# Patient Record
Sex: FEMALE | URBAN - METROPOLITAN AREA
[De-identification: names, ages, dates, MRNs, and addresses within clinical notes are randomized per-mention and may not be internally consistent; named-entity substitution may affect disease eponyms.]

---

## 2021-04-29 ENCOUNTER — OFFICE VISIT (OUTPATIENT)
Dept: URBAN - METROPOLITAN AREA CLINIC 64 | Facility: CLINIC | Age: 86
End: 2021-04-29
Payer: OTHER GOVERNMENT

## 2021-04-29 DIAGNOSIS — H02.413 MECHANICAL PTOSIS OF BILATERAL EYELIDS: ICD-10-CM

## 2021-04-29 DIAGNOSIS — H52.223 REGULAR ASTIGMATISM, BILATERAL: ICD-10-CM

## 2021-04-29 PROCEDURE — 92136 OPHTHALMIC BIOMETRY: CPT | Performed by: STUDENT IN AN ORGANIZED HEALTH CARE EDUCATION/TRAINING PROGRAM

## 2021-04-29 PROCEDURE — 99204 OFFICE O/P NEW MOD 45 MIN: CPT | Performed by: STUDENT IN AN ORGANIZED HEALTH CARE EDUCATION/TRAINING PROGRAM

## 2021-04-29 ASSESSMENT — INTRAOCULAR PRESSURE
OD: 9
OS: 12

## 2021-04-29 ASSESSMENT — VISUAL ACUITY
OS: 20/150
OD: 20/40

## 2021-04-29 NOTE — IMPRESSION/PLAN
Impression: Mechanical ptosis of bilateral eyelids: H02.413.  Plan: Referral to Dr Alejandro Frank for eval

## 2021-04-29 NOTE — IMPRESSION/PLAN
Impression: Combined forms of age-related cataract, left eye: H25.812. Plan: Discussed cataracts, treatment options, and surgical risks/benefits with patient including bleeding, infection, capsular break, glaucoma, corneal clouding. Pt understands changing glasses will not improve vision/glare. Discussed Premium options available. Pt understands the need for glasses after surgery if advanced technology lenses are not chosen and that there is NO PERFECT IOL. Pt understands that even the highest ATLs have limitations including but not limited to Halos/Glare/Difficulty in Dim Lighting and Intermediate BCVA may need glasses after SX. Patient elects surgical treatment. Recommend ORA. Recommend Dexycu + Moxifloxacin (PF) Intracameral Injection. Lens Recommendation: Monofocal
Technology: OK for ORA and LENSX Aim OS: -0.25. Notes:

## 2021-08-31 ENCOUNTER — PRE-OPERATIVE VISIT (OUTPATIENT)
Dept: URBAN - METROPOLITAN AREA CLINIC 64 | Facility: CLINIC | Age: 86
End: 2021-08-31
Payer: OTHER GOVERNMENT

## 2021-08-31 DIAGNOSIS — H35.3132 BILATERAL NONEXUDATIVE AGE-RELATED MACULAR DEGENERATION, INTERMEDIATE DRY STAGE: ICD-10-CM

## 2021-08-31 DIAGNOSIS — H25.812 COMBINED FORMS OF AGE-RELATED CATARACT, LEFT EYE: ICD-10-CM

## 2021-08-31 PROCEDURE — 99214 OFFICE O/P EST MOD 30 MIN: CPT | Performed by: STUDENT IN AN ORGANIZED HEALTH CARE EDUCATION/TRAINING PROGRAM

## 2021-08-31 PROCEDURE — 92015 DETERMINE REFRACTIVE STATE: CPT | Performed by: STUDENT IN AN ORGANIZED HEALTH CARE EDUCATION/TRAINING PROGRAM

## 2021-08-31 ASSESSMENT — VISUAL ACUITY
OD: 20/40
OS: 20/60

## 2021-08-31 ASSESSMENT — INTRAOCULAR PRESSURE
OD: 10
OS: 14

## 2021-08-31 NOTE — IMPRESSION/PLAN
Impression: Combined forms of age-related cataract, left eye: H25.812. Plan: Discussed cataracts, treatment options, and surgical risks/benefits with patient including bleeding, infection, capsular break, glaucoma, corneal clouding. Pt understands changing glasses will not improve vision/glare. Discussed Premium options available. Pt understands the need for glasses after surgery if advanced technology lenses are not chosen and that there is NO PERFECT IOL. Pt understands that even the highest ATLs have limitations including but not limited to Halos/Glare/Difficulty in Dim Lighting and Intermediate BCVA may need glasses after SX. Patient elects surgical treatment. Recommend ORA. Recommend Dexycu + Moxifloxacin (PF) Intracameral Injection. Lens Recommendation: Monofocal OS Technology: OK for ORA and [employer] Aim OD: -0.25. Aim OS: -0.25. Notes: 3+ Dense, plan for Vision [facility]

## 2021-08-31 NOTE — IMPRESSION/PLAN
Impression: Bilateral nonexudative age-related macular degeneration, intermediate dry stage: H35.3132. Plan: Macular OCT done today / Macular degeneration dry type. Patient was instructed the use of the Amsler grid and will continue monitoring vision. Recommend taking AREDS II vitamins and antioxidants. Patient informed to call office immediately with any vision and/or Amsler grid changes.

## 2021-09-10 ENCOUNTER — ADULT PHYSICAL (OUTPATIENT)
Dept: URBAN - METROPOLITAN AREA CLINIC 64 | Facility: CLINIC | Age: 86
End: 2021-09-10
Payer: OTHER GOVERNMENT

## 2021-09-10 DIAGNOSIS — Z01.818 ENCOUNTER FOR OTHER PREPROCEDURAL EXAMINATION: Primary | ICD-10-CM

## 2021-09-10 PROCEDURE — 99203 OFFICE O/P NEW LOW 30 MIN: CPT | Performed by: NURSE PRACTITIONER

## 2021-09-10 RX ORDER — DOCUSATE SODIUM 100 MG/1
100 MG CAPSULE ORAL
Qty: 0 | Refills: 0 | Status: ACTIVE
Start: 2021-09-10

## 2021-09-10 RX ORDER — OXYBUTYNIN CHLORIDE 5 MG/1
5 MG TABLET ORAL
Qty: 0 | Refills: 0 | Status: ACTIVE
Start: 2021-09-10

## 2021-09-10 RX ORDER — ASPIRIN 81 MG/1
81 MG TABLET, CHEWABLE ORAL
Qty: 0 | Refills: 0 | Status: INACTIVE
Start: 2021-09-10 | End: 2021-09-10

## 2021-09-10 RX ORDER — DONEPEZIL HYDROCHLORIDE 10 MG/1
10 MG TABLET, FILM COATED ORAL
Qty: 0 | Refills: 0 | Status: ACTIVE
Start: 2021-09-10

## 2021-09-10 RX ORDER — ASPIRIN 325 MG/1
325 MG TABLET, FILM COATED ORAL
Qty: 0 | Refills: 0 | Status: ACTIVE
Start: 2021-09-10

## 2021-09-10 RX ORDER — LOSARTAN POTASSIUM 25 MG/1
25 MG TABLET ORAL
Qty: 0 | Refills: 0 | Status: ACTIVE
Start: 2021-09-10

## 2021-09-10 RX ORDER — DONEPEZIL HYDROCHLORIDE 5 MG/1
5 MG TABLET, FILM COATED ORAL
Qty: 0 | Refills: 0 | Status: ACTIVE
Start: 2021-09-10

## 2021-09-22 ENCOUNTER — SURGERY (OUTPATIENT)
Dept: URBAN - METROPOLITAN AREA SURGERY 42 | Facility: SURGERY | Age: 86
End: 2021-09-22
Payer: OTHER GOVERNMENT